# Patient Record
Sex: FEMALE | Race: BLACK OR AFRICAN AMERICAN
[De-identification: names, ages, dates, MRNs, and addresses within clinical notes are randomized per-mention and may not be internally consistent; named-entity substitution may affect disease eponyms.]

---

## 2018-04-07 ENCOUNTER — HOSPITAL ENCOUNTER (EMERGENCY)
Dept: HOSPITAL 97 - ER | Age: 53
Discharge: HOME | End: 2018-04-07
Payer: SELF-PAY

## 2018-04-07 DIAGNOSIS — M62.830: Primary | ICD-10-CM
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
## 2018-04-07 PROCEDURE — 81003 URINALYSIS AUTO W/O SCOPE: CPT

## 2018-04-07 PROCEDURE — 81025 URINE PREGNANCY TEST: CPT

## 2018-04-07 PROCEDURE — 99285 EMERGENCY DEPT VISIT HI MDM: CPT

## 2018-04-07 PROCEDURE — 72125 CT NECK SPINE W/O DYE: CPT

## 2018-04-07 PROCEDURE — 72131 CT LUMBAR SPINE W/O DYE: CPT

## 2018-04-07 NOTE — RAD REPORT
EXAM DESCRIPTION:  CT - Spine Lumbar Wo Con - 4/7/2018 7:57 pm

 

CLINICAL HISTORY:   Trauma, back pain, radiculopathy.

 

COMPARISON:  None.

 

TECHNIQUE:  Axial noncontrast CT imaging of the lumbar spine was performed with coronal and sagittal 
re-formatted images.

 

All CT scans are performed using dose optimization technique as appropriate and may include automated
 exposure control or mA/KV adjustment according to patient size.

 

FINDINGS:  No acute lumbar spine fracture seen. No aggressive marrow pattern or malalignment.

 

Paraspinal tissues are normal in thickness. No paraspinal abscess or hematoma seen.

 

Evaluation of disc disease is limited, however a disc protrusion is suspected at L5-S1. This may be f
urther evaluated with nonemergent lumbar MR imaging.

 

IMPRESSION:  No acute lumbar spine abnormality is detected.

 

Consider MRI follow-up for assessment of disc disease if clinically desired.
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
## 2020-02-27 ENCOUNTER — HOSPITAL ENCOUNTER (EMERGENCY)
Dept: HOSPITAL 97 - ER | Age: 55
Discharge: HOME | End: 2020-02-27
Payer: COMMERCIAL

## 2020-02-27 VITALS — OXYGEN SATURATION: 100 %

## 2020-02-27 VITALS — SYSTOLIC BLOOD PRESSURE: 116 MMHG | DIASTOLIC BLOOD PRESSURE: 63 MMHG

## 2020-02-27 VITALS — TEMPERATURE: 98.2 F

## 2020-02-27 DIAGNOSIS — R10.84: ICD-10-CM

## 2020-02-27 DIAGNOSIS — E86.0: Primary | ICD-10-CM

## 2020-02-27 DIAGNOSIS — C50.919: ICD-10-CM

## 2020-02-27 DIAGNOSIS — R19.7: ICD-10-CM

## 2020-02-27 DIAGNOSIS — I10: ICD-10-CM

## 2020-02-27 LAB
ALBUMIN SERPL BCP-MCNC: 3.9 G/DL (ref 3.4–5)
ALP SERPL-CCNC: 97 U/L (ref 45–117)
ALT SERPL W P-5'-P-CCNC: 52 U/L (ref 12–78)
AST SERPL W P-5'-P-CCNC: 40 U/L (ref 15–37)
BUN BLD-MCNC: 15 MG/DL (ref 7–18)
BUN BLD-MCNC: 16 MG/DL (ref 7–18)
GLUCOSE SERPLBLD-MCNC: 91 MG/DL (ref 74–106)
GLUCOSE SERPLBLD-MCNC: 99 MG/DL (ref 74–106)
HCT VFR BLD CALC: 40.3 % (ref 36–45)
INR BLD: 1.01
LIPASE SERPL-CCNC: 48 U/L (ref 73–393)
LYMPHOCYTES # SPEC AUTO: 2 K/UL (ref 0.7–4.9)
MORPHOLOGY BLD-IMP: (no result)
PMV BLD: 10.6 FL (ref 7.6–11.3)
POTASSIUM SERPL-SCNC: 3 MMOL/L (ref 3.5–5.1)
POTASSIUM SERPL-SCNC: 3.4 MMOL/L (ref 3.5–5.1)
RBC # BLD: 5.67 M/UL (ref 3.86–4.86)
TROPONIN (EMERG DEPT USE ONLY): < 0.02 NG/ML (ref 0–0.04)
UA COMPLETE W REFLEX CULTURE PNL UR: (no result)

## 2020-02-27 PROCEDURE — 85730 THROMBOPLASTIN TIME PARTIAL: CPT

## 2020-02-27 PROCEDURE — 96367 TX/PROPH/DG ADDL SEQ IV INF: CPT

## 2020-02-27 PROCEDURE — 96365 THER/PROPH/DIAG IV INF INIT: CPT

## 2020-02-27 PROCEDURE — 82947 ASSAY GLUCOSE BLOOD QUANT: CPT

## 2020-02-27 PROCEDURE — 71045 X-RAY EXAM CHEST 1 VIEW: CPT

## 2020-02-27 PROCEDURE — 74177 CT ABD & PELVIS W/CONTRAST: CPT

## 2020-02-27 PROCEDURE — 96375 TX/PRO/DX INJ NEW DRUG ADDON: CPT

## 2020-02-27 PROCEDURE — 81025 URINE PREGNANCY TEST: CPT

## 2020-02-27 PROCEDURE — 87040 BLOOD CULTURE FOR BACTERIA: CPT

## 2020-02-27 PROCEDURE — 84145 PROCALCITONIN (PCT): CPT

## 2020-02-27 PROCEDURE — 83690 ASSAY OF LIPASE: CPT

## 2020-02-27 PROCEDURE — 85025 COMPLETE CBC W/AUTO DIFF WBC: CPT

## 2020-02-27 PROCEDURE — 80048 BASIC METABOLIC PNL TOTAL CA: CPT

## 2020-02-27 PROCEDURE — 99284 EMERGENCY DEPT VISIT MOD MDM: CPT

## 2020-02-27 PROCEDURE — 80076 HEPATIC FUNCTION PANEL: CPT

## 2020-02-27 PROCEDURE — 93005 ELECTROCARDIOGRAM TRACING: CPT

## 2020-02-27 PROCEDURE — 36415 COLL VENOUS BLD VENIPUNCTURE: CPT

## 2020-02-27 PROCEDURE — 83605 ASSAY OF LACTIC ACID: CPT

## 2020-02-27 PROCEDURE — 85610 PROTHROMBIN TIME: CPT

## 2020-02-27 PROCEDURE — 84484 ASSAY OF TROPONIN QUANT: CPT

## 2020-02-27 PROCEDURE — 81015 MICROSCOPIC EXAM OF URINE: CPT

## 2020-02-27 PROCEDURE — 81003 URINALYSIS AUTO W/O SCOPE: CPT

## 2020-02-27 NOTE — RAD REPORT
EXAM DESCRIPTION:  CTAbdomen   Pelvis W Contrast - 2/27/2020 5:21 pm

 

CLINICAL HISTORY:  Abdominal pain.

ABD PAIN

 

COMPARISON:  No comparisons

 

TECHNIQUE:  Biphasic CT imaging of the abdomen and pelvis was performed with 100 ml non-ionic IV cont
rast.

 

All CT scans are performed using dose optimization technique as appropriate and may include automated
 exposure control or mA/KV adjustment according to patient size.

 

FINDINGS:  The lung bases are clear.Small hiatal hernia.

 

The liver, spleen, pancreas, adrenal glands and kidneys are within normal limits.

 

No bowel obstruction, free air, free fluid or abscess.  The appendix is normal.  No evidence of signi
ficant lymphadenopathy.

 

Mild lower lumbar spondylosis.

 

IMPRESSION:  No acute intra-abdominal or pelvic finding.

## 2020-02-27 NOTE — RAD REPORT
EXAM DESCRIPTION:  RAD - Chest Single View - 2/27/2020 3:54 pm

 

CLINICAL HISTORY:  DYSPNEA

Chest pain.

 

COMPARISON:  Thorax W/ Con dated 2/3/2020

 

FINDINGS:  Portable technique limits examination quality.

 

The lungs are grossly clear. The heart is normal in size. No displaced fractures.A left port catheter
 its tip in the SVC.

 

IMPRESSION:  No acute intrathoracic process suspected.

## 2020-02-27 NOTE — EDPHYS
Physician Documentation                                                                           

 [facility]                                                                 

Name: Osiris Bella                                                                                 

Age: 54 yrs                                                                                       

Sex: Female                                                                                       

: 1965                                                                                   

MRN: [id number]                                                                                   

Arrival Date: 2020                                                                          

Time: 15:10                                                                                       

Account#: [id number]                                                                            

Bed 23                                                                                            

Private MD:                                                                                       

ED Physician Ernie Nolasco                                                                      

HPI:                                                                                              

                                                                                             

17:04 This 54 yrs old Black Female presents to ER via Wheelchair with complaints of Abdominal jr8 

      Pain, Nausea, Weakness, Chest Pain > 29 y/o.                                                

17:04 Onset: The symptoms/episode began/occurred acutely, 5 day(s) ago. Associated signs and  jr8 

      symptoms: Pertinent positives: nausea, vomiting, and diarrhea. The symptoms are             

      described as stabbing. Modifying factors: The symptoms are alleviated by nothing, the       

      symptoms are aggravated by nothing. Severity of pain: At its worst the pain was             

      moderate in the emergency department the pain is unchanged. The patient has not             

      experienced similar symptoms in the past. The patient has not recently seen a               

      physician. Patient with stage 1 breast cancer who took her first chemo treatment this       

      past Thursday. Stated that two days later started to have n/v/d and abdominal pain that     

      is not resolving .                                                                          

                                                                                                  

OB/GYN:                                                                                           

15:46 LMP N/A - Hysterectomy                                                                  vc  

                                                                                                  

Historical:                                                                                       

- Allergies:                                                                                      

15:22 No Known Allergies;                                                                     ll1 

- PMHx:                                                                                           

15:22 Anemia; breast CA with chemo; Hypertension; low potassium;                              ll1 

- PSHx:                                                                                           

15:22 ; Hysterectomy;                                                                ll1 

                                                                                                  

- Immunization history:: Adult Immunizations up to date.                                          

- Social history:: Patient/guardian denies using alcohol, street drugs, tobacco                   

  products, Smoking status: Patient denies any tobacco usage or history of.                       

                                                                                                  

                                                                                                  

ROS:                                                                                              

17:04 Eyes: Negative for injury, pain, redness, and discharge, ENT: Negative for injury,      jr8 

      pain, and discharge, Neck: Negative for injury, pain, and swelling, Cardiovascular:         

      Negative for chest pain, palpitations, and edema, Respiratory: Negative for shortness       

      of breath, cough, wheezing, and pleuritic chest pain, Back: Negative for injury and         

      pain, MS/Extremity: Negative for injury and deformity, Skin: Negative for injury, rash,     

      and discoloration, Neuro: Negative for headache, weakness, numbness, tingling, and          

      seizure.                                                                                    

17:04 Abdomen/GI: Positive for abdominal pain, nausea, vomiting, and diarrhea, Negative for       

      abdominal distension, anorexia, dysphagia, hematemesis, black/tarry stool, rectal pain,     

      rectal bleeding, bowel incontinence, flatulence.                                            

                                                                                                  

Exam:                                                                                             

17:04 Eyes:  Pupils equal round and reactive to light, extra-ocular motions intact.  Lids and jr8 

      lashes normal.  Conjunctiva and sclera are non-icteric and not injected.  Cornea within     

      normal limits.  Periorbital areas with no swelling, redness, or edema. ENT:  Nares          

      patent. No nasal discharge, no septal abnormalities noted.  Tympanic membranes are          

      normal and external auditory canals are clear.  Oropharynx with no redness, swelling,       

      or masses, exudates, or evidence of obstruction, uvula midline.  Mucous membranes           

      moist. Chest/axilla:  Normal chest wall appearance and motion.  Nontender with no           

      deformity.  No lesions are appreciated. Cardiovascular:  Regular rate and rhythm with a     

      normal S1 and S2.  No gallops, murmurs, or rubs.  Normal PMI, no JVD.  No pulse             

      deficits. Respiratory:  Lungs have equal breath sounds bilaterally, clear to                

      auscultation and percussion.  No rales, rhonchi or wheezes noted.  No increased work of     

      breathing, no retractions or nasal flaring. Back:  No spinal tenderness.  No                

      costovertebral tenderness.  Full range of motion. Skin:  Warm, dry with normal turgor.      

      Normal color with no rashes, no lesions, and no evidence of cellulitis. MS/ Extremity:      

      Pulses equal, no cyanosis.  Neurovascular intact.  Full, normal range of motion. Neuro:     

       Awake and alert, GCS 15, oriented to person, place, time, and situation.  Cranial          

      nerves II-XII grossly intact.  Motor strength 5/5 in all extremities.  Sensory grossly      

      intact.  Cerebellar exam normal.  Normal gait.                                              

17:04 Abdomen/GI: Inspection: abdomen appears normal, Bowel sounds: active, all quadrants,        

      Palpation: soft, in all quadrants, mild abdominal tenderness, in the right upper            

      quadrant, left upper quadrant, right lower quadrant and left lower quadrant, mass, is       

      not appreciated, rebound tenderness, is not appreciated, voluntary guarding, is not         

      appreciated, involuntary guarding, is not appreciated, no appreciated organomegaly,         

      Indicators: McBurney's point is not tender, Clifton's sign is negative, Rovsing's sign       

      is negative, Liver: tenderness, is not appreciated.                                         

                                                                                                  

Vital Signs:                                                                                      

15:19  / 77; Pulse 108; Resp 22; Temp 99.0(T); Pulse Ox 100% ; Pain 10/10;              ll1 

15:30  / 78; Pulse 127; Resp 34; Pulse Ox 100% on R/A; Pain 10/10;                      vc  

15:46 Weight 56.7 kg;                                                                         vc  

16:30 Pulse 95; Resp 25; Pulse Ox 100% on R/A;                                                vc  

17:30  / 62; Pulse 97; Resp 15; Temp 98.2; Pulse Ox 97% on R/A; Pain 0/10;              vc  

18:00  / 83; Pulse 87; Resp 18; Pulse Ox 100% on R/A;                                   vc  

19:00  / 63; Pulse 83; Resp 13; Pulse Ox 100% on R/A;                                   vc  

                                                                                                  

MDM:                                                                                              

15:24 Patient medically screened.                                                             [facility] 

19:07 Data reviewed: vital signs, nurses notes, lab test result(s), EKG, radiologic studies,  [facility] 

      CT scan, plain films. Data interpreted: Pulse oximetry: on room air is 100 %.               

      Interpretation: normal. Counseling: I had a detailed discussion with the patient and/or     

      guardian regarding: the historical points, exam findings, and any diagnostic results        

      supporting the discharge/admit diagnosis, lab results, radiology results, the need for      

      outpatient follow up, Oncology, to return to the emergency department if symptoms           

      worsen or persist or if there are any questions or concerns that arise at home.             

      Response to treatment: the patient's symptoms have markedly improved after treatment,       

      patient is well hydrated. ED course: Patient feeling much better after fluids and           

      electrolyte replacement. Labs near normal now. No acute infectious findings. Called her     

      oncologist who is good with everything and her going home as long as pain and nausea is     

      controlled which it is at this point. Will see her in clinic tomorrow morning. Patient      

      good with this plan and knows to come back if worse .                                       

                                                                                                  

                                                                                             

15:23 Order name: Basic Metabolic Panel; Complete Time: 17:02                                 [facility] 

                                                                                             

15:23 Order name: Blood Culture Adult (2)                                                     [facility] 

                                                                                             

15:23 Order name: CBC with Diff; Complete Time: 16:39                                         [facility] 

                                                                                             

15:23 Order name: Lactate; Complete Time: 16:39                                               [facility] 

                                                                                             

15:23 Order name: LFT's; Complete Time: 17:02                                                                                                                                              

15:23 Order name: Lipase; Complete Time: 17:02                                                                                                                                             

15:23 Order name: Procalcitonin; Complete Time: 16:54                                                                                                                                      

15:23 Order name: Protime (+inr); Complete Time: 16:39                                                                                                                                     

15:23 Order name: Ptt, Activated; Complete Time: 16:39                                                                                                                                     

15:23 Order name: Troponin (emerg Dept Use Only); Complete Time: 17:02                                                                                                                     

15:23 Order name: Urine Microscopic Only; Complete Time: 16:54                                                                                                                             

15:56 Order name: Glucose, Ancillary Testing; Complete Time: 16:08                            [facility]

                                                                                             

15:59 Order name: Urine Dipstick--Ancillary (enter results); Complete Time: 16:54               

                                                                                             

15:59 Order name: Urine Pregnancy--Ancillary (enter results); Complete Time: 16:54              

                                                                                             

15:23 Order name: Chest Single View XRAY; Complete Time: 16:08                                                                                                                             

15:23 Order name: Accucheck; Complete Time: 15:47                                                                                                                                          

15:23 Order name: Cardiac monitoring; Complete Time: 15:47                                                                                                                                 

15:23 Order name: EKG - Nurse/Tech; Complete Time: 15:47                                                                                                                                   

16:38 Order name: Manual Differential; Complete Time: 16:39                                   [facility]

                                                                                             

16:55 Order name: CT Abd/Pelvis - IV Contrast Only; Complete Time: 17:31                                                                                                                   

18:23 Order name: BMP; Complete Time: 19:06                                                                                                                                                

18:23 Order name: Lactate; Complete Time: 19:10                                                                                                                                            

15:23 Order name: IV Saline Lock - Large Bore; Complete Time: 15:47                                                                                                                        

15:23 Order name: Labs collected and sent; Complete Time: 15:48                                                                                                                            

15:23 Order name: O2 Per Protocol; Complete Time: 15:48                                                                                                                                    

15:23 Order name: O2 Sat Monitoring; Complete Time: 15:48                                                                                                                                  

15:23 Order name: Urine Dipstick-Ancillary (obtain specimen); Complete Time: 15:48            jr8 

                                                                                                  

Administered Medications:                                                                         

15:50 Drug: Acetaminophen 1000 mg Route: PO;                                                  vc  

17:17 Follow up: Response: No adverse reaction; Temperature is decreased                      vc  

16:00 Drug: Zofran (Ondansetron) 4 mg Route: IVP; Site: left antecubital;                     vc  

17:15 Follow up: Response: No adverse reaction; Marked relief of symptoms                     vc  

16:04 Drug: morphine 4 mg Route: IVP; Site: left antecubital;                                 vc  

17:16 Follow up: Response: No adverse reaction; Pain is decreased                             vc  

16:10 Drug: NS 0.9% (30 ml/kg) 30 ml/kg Route: IV; Rate: bolus; Site: right antecubital;      vc  

16:10 Drug: Flagyl 500 mg Volume: 100 ml; Route: IVPB; Rate: 200 ml/hr; Infused Over: 30      vc  

      mins; Site: right antecubital;                                                              

16:40 Follow up: IV Status: Completed infusion; IV Intake: 100ml                              vc  

16:12 Drug: NS 0.9% (30 ml/kg) 30 ml/kg Route: IV; Rate: bolus; Site: left antecubital;       vc  

16:30 Follow up: IV Status: Completed infusion; IV Intake: 1700ml                             vc  

16:12 Drug: Cefepime 1 grams Route: IVPB; Rate: 200 ml/hr; Infused Over: 30 mins; Site: left  vc  

      antecubital;                                                                                

17:39 Drug: Potassium Chloride 20 mEq Route: IV; Rate: calculated rate; Site: left            vc  

      antecubital;                                                                                

18:30 Follow up: IV Status: Completed infusion; IV Intake: 100ml                              vc  

19:35 Drug: morphine 4 mg Route: IVP; Site: right forearm;                                    vc  

19:35 Follow up: Response: No adverse reaction; Medication administered at discharge.         vc  

                                                                                                  

                                                                                                  

Disposition:                                                                                      

20 19:09 Discharged to Home. Impression: Nausea and vomiting, Diarrhea, unspecified,        

  Dehydration, Generalized abdominal pain.                                                        

- Condition is Stable.                                                                            

- Discharge Instructions: Food Choices to Help Relieve Diarrhea, Adult, Dehydration,              

  Adult, Diarrhea, Adult, Nausea and Vomiting, Adult.                                             

- Prescriptions for Bentyl 20 mg Oral Tablet - take 1 tablet by ORAL route every 6                

  hours As needed; 20 tablet.                                                                     

- Medication Reconciliation Form, Thank You Letter, Antibiotic Education, Prescription            

  Opioid Use form.                                                                                

- Follow up: Clarice Esposito MD; When: Tomorrow; Reason: Recheck today's                 

  complaints, Continuance of care, Re-evaluation by your physician.                               

- Problem is new.                                                                                 

- Symptoms have improved.                                                                         

                                                                                                  

                                                                                                  

                                                                                                  

Signatures:                                                                                       

Dispatcher [employer]                           EDMS                                                 

Johnson Hathaway PA PA   jr8                                                  

Ernie Nolasco MD MD   ps1                                                  

Opal Rene RN                    RN   vc                                                   

Alex Fernandez RN                       RN   ll1                                                  

                                                                                                  

Corrections: (The following items were deleted from the chart)                                    

19:39 19:09 2020 19:09 Discharged to Home. Impression: Nausea and vomiting; Diarrhea,   vc  

      unspecified; Dehydration; Generalized abdominal pain. Condition is Stable. Forms are        

      Medication Reconciliation Form, Thank You Letter, Antibiotic Education, Prescription        

      Opioid Use. Follow up: Clarice Braun; When: Tomorrow; Reason: Recheck today's      

      complaints, Continuance of care, Re-evaluation by your physician. Problem is new.           

      Symptoms have improved. jr8                                                                 

                                                                                                  

**************************************************************************************************

## 2020-02-27 NOTE — ER
Nurse's Notes                                                                                     

 [facility]                                                                 

Name: Osiris Bella                                                                                 

Age: 54 yrs                                                                                       

Sex: Female                                                                                       

: 1965                                                                                   

MRN: [id number]                                                                                   

Arrival Date: 2020                                                                          

Time: 15:10                                                                                       

Account#: [id number]                                                                            

Bed 23                                                                                            

Private MD:                                                                                       

Diagnosis: Nausea and vomiting;Diarrhea, unspecified;Dehydration;Generalized abdominal pain       

                                                                                                  

Presentation:                                                                                     

                                                                                             

15:19 Chief complaint: Patient states: Abdominal pain with nausea and diarrhea for 6 days     ll1 

      after getting chemo treatment last Thursday. Chest pain intermittently for 1 week.          

      Reports SOB. Coronavirus screen: The patient has NOT traveled to [place] in the past 14       

      days. Ebola Screen: No symptoms or risks identified at this time. Initial Sepsis            

      Screen: Does the patient meet any 2 criteria? RR > 20 per min. HR > 90 bpm. Yes Does        

      the patient have a suspected source of infection? Yes: Acute abdominal pain. Risk           

      Assessment: Do you want to hurt yourself or someone else? Patient reports no desire to      

      harm self or others.                                                                        

15:19 Method Of Arrival: Wheelchair                                                           ll1 

15:19 Acuity: PARUL 3                                                                           ll1 

15:30 Onset of symptoms was 2020.                                                vc  

                                                                                                  

OB/GYN:                                                                                           

15:46 LMP N/A - Hysterectomy                                                                  vc  

                                                                                                  

Historical:                                                                                       

- Allergies:                                                                                      

15:22 No Known Allergies;                                                                     ll1 

- PMHx:                                                                                           

15:22 Anemia; breast CA with chemo; Hypertension; low potassium;                              ll1 

- PSHx:                                                                                           

15:22 ; Hysterectomy;                                                                ll1 

                                                                                                  

- Immunization history:: Adult Immunizations up to date.                                          

- Social history:: Patient/guardian denies using alcohol, street drugs, tobacco                   

  products, Smoking status: Patient denies any tobacco usage or history of.                       

                                                                                                  

                                                                                                  

Screening:                                                                                        

15:30 Abuse screen: Denies threats or abuse. Nutritional screening: Has had N/V for 3 or more vc  

      days. Tuberculosis screening: No symptoms or risk factors identified. Fall Risk None        

      identified.                                                                                 

                                                                                                  

Assessment:                                                                                       

15:30 General: Appears in no apparent distress. uncomfortable, ill, Behavior is cooperative,  vc  

      agitated, anxious, crying. Pain: Complains of pain in abdomen. Neuro: Level of              

      Consciousness is awake, alert, obeys commands, Oriented to person, place, time.             

      Cardiovascular: Patient's skin is warm and dry. Respiratory: Airway is patent               

      Respiratory effort is even, unlabored, Respiratory pattern is symmetrical, tachypnea.       

      GI: Bowel sounds present X 4 quads. Abd is soft Abdomen is tender to palpation X 4          

      quads. : No signs and/or symptoms were reported regarding the genitourinary system.       

      EENT: No deficits noted. Derm: Skin temperature is warm. Musculoskeletal: Circulation,      

      motion, and sensation intact. Range of motion: intact in all extremities.                   

16:30 Reassessment: Patient and/or family updated on plan of care and expected duration. Pain vc  

      level reassessed. Patient states feeling better. Patient states symptoms have improved.     

17:15 Reassessment: Patient and/or family updated on plan of care and expected duration. Pain vc  

      level reassessed. Patient is alert, oriented x 3, equal unlabored respirations, skin        

      warm/dry/pink.                                                                              

18:15 Reassessment: Patient and/or family updated on plan of care and expected duration. Pain vc  

      level reassessed. Patient is alert, oriented x 3, equal unlabored respirations, skin        

      warm/dry/pink. Patient states feeling better. Patient states symptoms have improved.        

19:00 Reassessment: Patient and/or family updated on plan of care and expected duration. Pain vc  

      level reassessed. Patient is alert, oriented x 3, equal unlabored respirations, skin        

      warm/dry/pink. Patient states feeling better. Patient states symptoms have improved.        

                                                                                                  

Vital Signs:                                                                                      

15:19  / 77; Pulse 108; Resp 22; Temp 99.0(T); Pulse Ox 100% ; Pain 10/10;              ll1 

15:30  / 78; Pulse 127; Resp 34; Pulse Ox 100% on R/A; Pain 10/10;                      vc  

15:46 Weight 56.7 kg;                                                                         vc  

16:30 Pulse 95; Resp 25; Pulse Ox 100% on R/A;                                                vc  

17:30  / 62; Pulse 97; Resp 15; Temp 98.2; Pulse Ox 97% on R/A; Pain 0/10;              vc  

18:00  / 83; Pulse 87; Resp 18; Pulse Ox 100% on R/A;                                   vc  

19:00  / 63; Pulse 83; Resp 13; Pulse Ox 100% on R/A;                                   vc  

                                                                                                  

ED Course:                                                                                        

15:10 Patient arrived in ED.                                                                  fj1 

15:14 Opal Rene, RN is Primary Nurse.                                                  vc  

15:21 Triage completed.                                                                       ll1 

15:22 Roszak, Johnson, PA is PHCP.                                                               [id number] 

15:22 Ernie Nolasco MD is Attending Physician.                                             jr8 

15:22 Arm band placed on left wrist. Patient placed in an exam room.                          ll1 

15:30 Patient has correct armband on for positive identification. Placed in gown. Bed in low  vc  

      position. Call light in reach. Side rails up X2. Cardiac monitor on. Pulse ox on. NIBP      

      on.                                                                                         

15:54 Chest Single View XRAY In Process Unspecified.                                          EDMS

17:21 CT Abd/Pelvis - IV Contrast Only In Process Unspecified.                                EDMS

19:09 Clarice Esposito MD is Referral Physician.                                      [id number] 

19:35 No provider procedures requiring assistance completed. IV discontinued, intact,         vc  

      bleeding controlled, No redness/swelling at site. Pressure dressing applied.                

                                                                                                  

Administered Medications:                                                                         

15:50 Drug: Acetaminophen 1000 mg Route: PO;                                                  vc  

17:17 Follow up: Response: No adverse reaction; Temperature is decreased                      vc  

16:00 Drug: Zofran (Ondansetron) 4 mg Route: IVP; Site: left antecubital;                     vc  

17:15 Follow up: Response: No adverse reaction; Marked relief of symptoms                     vc  

16:04 Drug: morphine 4 mg Route: IVP; Site: left antecubital;                                 vc  

17:16 Follow up: Response: No adverse reaction; Pain is decreased                             vc  

16:10 Drug: NS 0.9% (30 ml/kg) 30 ml/kg Route: IV; Rate: bolus; Site: right antecubital;      vc  

16:10 Drug: Flagyl 500 mg Volume: 100 ml; Route: IVPB; Rate: 200 ml/hr; Infused Over: 30      vc  

      mins; Site: right antecubital;                                                              

16:40 Follow up: IV Status: Completed infusion; IV Intake: 100ml                              vc  

16:12 Drug: NS 0.9% (30 ml/kg) 30 ml/kg Route: IV; Rate: bolus; Site: left antecubital;       vc  

16:30 Follow up: IV Status: Completed infusion; IV Intake: 1700ml                             vc  

16:12 Drug: Cefepime 1 grams Route: IVPB; Rate: 200 ml/hr; Infused Over: 30 mins; Site: left  vc  

      antecubital;                                                                                

17:39 Drug: Potassium Chloride 20 mEq Route: IV; Rate: calculated rate; Site: left            vc  

      antecubital;                                                                                

18:30 Follow up: IV Status: Completed infusion; IV Intake: 100ml                              vc  

19:35 Drug: morphine 4 mg Route: IVP; Site: right forearm;                                    vc  

19:35 Follow up: Response: No adverse reaction; Medication administered at discharge.         vc  

                                                                                                  

                                                                                                  

Intake:                                                                                           

16:30 IV: 1700ml; Total: 1700ml.                                                              vc  

16:40 IV: 100ml; Total: 1800ml.                                                               vc  

18:30 IV: 100ml; Total: 1900ml.                                                               vc  

                                                                                                  

Outcome:                                                                                          

19:09 Discharge ordered by MD. thurman 

19:35 Discharged to home ambulatory.                                                          vc  

19:35 Condition: good                                                                             

19:35 Discharge instructions given to patient, Instructed on discharge instructions, follow       

      up and referral plans. Demonstrated understanding of instructions, follow-up care.          

19:39 Patient left the ED.                                                                    vc  

                                                                                                  

Signatures:                                                                                       

Dispatcher [employer]                           EDMS                                                 

Johnson Hathaway PA PA   jr8                                                  

Opal Rene RN                    RN   Tawanda Rodriguez                                 fj1                                                  

Alex Fernandez RN                       RN   ll1                                                  

                                                                                                  

**************************************************************************************************

## 2020-03-02 NOTE — EKG
Test Date:    2020-02-27               Test Time:    15:24:00

Technician:   NURY                                    

                                                     

MEASUREMENT RESULTS:                                       

Intervals:                                           

Rate:         110                                    

KY:           152                                    

QRSD:         78                                     

QT:           370                                    

QTc:          500                                    

Axis:                                                

P:            61                                     

KY:           152                                    

QRS:          -31                                    

T:            43                                     

                                                     

INTERPRETIVE STATEMENTS:                                       

                                                     

Sinus tachycardia

Possible Left atrial enlargement

Left axis deviation

Abnormal ECG

No previous ECG available for comparison



Electronically Signed On 03-02-20 15:40:49 CST by Aftab Higgins